# Patient Record
Sex: FEMALE | Race: WHITE
[De-identification: names, ages, dates, MRNs, and addresses within clinical notes are randomized per-mention and may not be internally consistent; named-entity substitution may affect disease eponyms.]

---

## 2023-07-14 ENCOUNTER — APPOINTMENT (OUTPATIENT)
Dept: RHEUMATOLOGY | Facility: CLINIC | Age: 28
End: 2023-07-14
Payer: COMMERCIAL

## 2023-07-14 VITALS
SYSTOLIC BLOOD PRESSURE: 126 MMHG | BODY MASS INDEX: 19.49 KG/M2 | HEIGHT: 63 IN | HEART RATE: 82 BPM | WEIGHT: 110 LBS | DIASTOLIC BLOOD PRESSURE: 69 MMHG | OXYGEN SATURATION: 100 % | TEMPERATURE: 98.2 F

## 2023-07-14 DIAGNOSIS — E03.9 HYPOTHYROIDISM, UNSPECIFIED: ICD-10-CM

## 2023-07-14 DIAGNOSIS — E28.2 POLYCYSTIC OVARIAN SYNDROME: ICD-10-CM

## 2023-07-14 DIAGNOSIS — L80 VITILIGO: ICD-10-CM

## 2023-07-14 PROCEDURE — 99203 OFFICE O/P NEW LOW 30 MIN: CPT

## 2023-07-14 NOTE — ASSESSMENT
[FreeTextEntry1] : 29 y/o female w/ PCOS, hypothyroidism referred to rheumatology for vitiligo.\par Pt has been treated with vitiligo with dermatology and blepharitis by ophthalmology\par Pt has mild symptoms of excessive hair, fatigue, night sweats, anxiety.\par Pt denies other systemic symptoms.\par Patient mainly here to discuss her concerns about vitiligo and questions about causes of autoimmune diseases and any triggers.\par \par Discussed with pt at length. Discussed that pt has higher chance of developing other autoimmune diseases but such diagnosis would be based on her symptoms in the future. \par Discussed about current thoughts on combination of genetic predisposition and various combination of environmental triggers for development of autoimmune diseases.\par Pt may be started on Rinvoq for vitiligo. Discussed with pt about potential side effects of Rinvoq, but any discussion about data on effectiveness of Rinvoq for vitiligo should be discussed with dermatology.\par Pt understood and grateful for the counseling\par \par RTC PRN

## 2023-07-14 NOTE — HISTORY OF PRESENT ILLNESS
[FreeTextEntry1] : 29 y/o female w/ PCOS, hypothyroidism referred to rheumatology for vitiligo.\par Pt has been treated with vitiligo with dermatology and blepharitis by ophthalmology\par Pt has mild symptoms of excessive hair, fatigue, night sweats, anxiety.\par Pt denies other systemic symptoms.\par Patient mainly here to discuss her concerns about vitiligo and questions about causes of autoimmune diseases and any triggers.